# Patient Record
Sex: MALE | Race: WHITE
[De-identification: names, ages, dates, MRNs, and addresses within clinical notes are randomized per-mention and may not be internally consistent; named-entity substitution may affect disease eponyms.]

---

## 2019-02-18 ENCOUNTER — HOSPITAL ENCOUNTER (EMERGENCY)
Dept: HOSPITAL 4 - SED | Age: 44
LOS: 1 days | Discharge: HOME | End: 2019-02-19
Payer: COMMERCIAL

## 2019-02-18 VITALS — SYSTOLIC BLOOD PRESSURE: 136 MMHG

## 2019-02-18 VITALS — HEIGHT: 70 IN | BODY MASS INDEX: 28.63 KG/M2 | WEIGHT: 200 LBS

## 2019-02-18 DIAGNOSIS — Z86.73: ICD-10-CM

## 2019-02-18 DIAGNOSIS — Z88.1: ICD-10-CM

## 2019-02-18 DIAGNOSIS — R06.02: Primary | ICD-10-CM

## 2019-02-18 DIAGNOSIS — I10: ICD-10-CM

## 2019-02-18 LAB
ALBUMIN SERPL BCP-MCNC: 2.7 G/DL (ref 3.4–4.8)
ALT SERPL W P-5'-P-CCNC: 159 U/L (ref 12–78)
AMYLASE SERPL-CCNC: 110 U/L (ref 0–100)
ANION GAP SERPL CALCULATED.3IONS-SCNC: 10 MMOL/L (ref 5–15)
APAP SERPL-MCNC: < 1 UG/ML (ref 1–30)
AST SERPL W P-5'-P-CCNC: 62 U/L (ref 10–37)
BASOPHILS # BLD AUTO: 0.2 K/UL (ref 0–0.2)
BASOPHILS NFR BLD AUTO: 2 % (ref 0–2)
BILIRUB SERPL-MCNC: 0.3 MG/DL (ref 0–1)
BUN SERPL-MCNC: 17 MG/DL (ref 8–21)
CALCIUM SERPL-MCNC: 9.2 MG/DL (ref 8.4–11)
CHLORIDE SERPL-SCNC: 104 MMOL/L (ref 98–107)
CREAT SERPL-MCNC: 0.71 MG/DL (ref 0.55–1.3)
EOSINOPHIL # BLD AUTO: 0.2 K/UL (ref 0–0.4)
EOSINOPHIL NFR BLD AUTO: 1.4 % (ref 0–4)
ERYTHROCYTE [DISTWIDTH] IN BLOOD BY AUTOMATED COUNT: 14.4 % (ref 9–15)
ETHANOL SERPL-MCNC: < 3 MG/DL (ref ?–10)
GFR SERPL CREATININE-BSD FRML MDRD: 155 ML/MIN (ref 90–?)
GLUCOSE SERPL-MCNC: 94 MG/DL (ref 70–99)
HCT VFR BLD AUTO: 45.2 % (ref 36–54)
HGB BLD-MCNC: 14.6 G/DL (ref 14–18)
INR PPP: 0.9 (ref 0.8–1.2)
LIPASE SERPL-CCNC: 365 U/L (ref 73–393)
LYMPHOCYTES # BLD AUTO: 1.1 K/UL (ref 1–5.5)
LYMPHOCYTES NFR BLD AUTO: 9.2 % (ref 20.5–51.5)
MCH RBC QN AUTO: 29 PG (ref 27–31)
MCHC RBC AUTO-ENTMCNC: 32 % (ref 32–36)
MCV RBC AUTO: 91 FL (ref 79–98)
MONOCYTES # BLD MANUAL: 0.8 K/UL (ref 0–1)
MONOCYTES # BLD MANUAL: 6.7 % (ref 1.7–9.3)
NEUTROPHILS # BLD AUTO: 9.2 K/UL (ref 1.8–7.7)
NEUTROPHILS NFR BLD AUTO: 80.7 % (ref 40–70)
PLATELET # BLD AUTO: 191 K/UL (ref 130–430)
POTASSIUM SERPL-SCNC: 3.5 MMOL/L (ref 3.5–5.1)
PROTHROMBIN TIME: 9.4 SECS (ref 9.5–12.5)
RBC # BLD AUTO: 4.99 MIL/UL (ref 4.2–6.2)
SODIUM SERPLBLD-SCNC: 140 MMOL/L (ref 136–145)
WBC # BLD AUTO: 11.5 K/UL (ref 4.8–10.8)

## 2019-02-18 PROCEDURE — 94002 VENT MGMT INPAT INIT DAY: CPT

## 2019-02-18 PROCEDURE — 93005 ELECTROCARDIOGRAM TRACING: CPT

## 2019-02-18 PROCEDURE — G0482 DRUG TEST DEF 15-21 CLASSES: HCPCS

## 2019-02-18 PROCEDURE — 85730 THROMBOPLASTIN TIME PARTIAL: CPT

## 2019-02-18 PROCEDURE — 99284 EMERGENCY DEPT VISIT MOD MDM: CPT

## 2019-02-18 PROCEDURE — 85610 PROTHROMBIN TIME: CPT

## 2019-02-18 PROCEDURE — 94640 AIRWAY INHALATION TREATMENT: CPT

## 2019-02-18 PROCEDURE — 87040 BLOOD CULTURE FOR BACTERIA: CPT

## 2019-02-18 PROCEDURE — 83605 ASSAY OF LACTIC ACID: CPT

## 2019-02-18 PROCEDURE — 85025 COMPLETE CBC W/AUTO DIFF WBC: CPT

## 2019-02-18 PROCEDURE — 82150 ASSAY OF AMYLASE: CPT

## 2019-02-18 PROCEDURE — 36600 WITHDRAWAL OF ARTERIAL BLOOD: CPT

## 2019-02-18 PROCEDURE — 82550 ASSAY OF CK (CPK): CPT

## 2019-02-18 PROCEDURE — 80053 COMPREHEN METABOLIC PANEL: CPT

## 2019-02-18 PROCEDURE — 36415 COLL VENOUS BLD VENIPUNCTURE: CPT

## 2019-02-18 PROCEDURE — 71045 X-RAY EXAM CHEST 1 VIEW: CPT

## 2019-02-18 PROCEDURE — 84484 ASSAY OF TROPONIN QUANT: CPT

## 2019-02-18 PROCEDURE — 82803 BLOOD GASES ANY COMBINATION: CPT

## 2019-02-18 PROCEDURE — 83690 ASSAY OF LIPASE: CPT

## 2019-02-18 PROCEDURE — G0480 DRUG TEST DEF 1-7 CLASSES: HCPCS

## 2019-02-19 VITALS — SYSTOLIC BLOOD PRESSURE: 128 MMHG

## 2020-06-19 ENCOUNTER — HOSPITAL ENCOUNTER (INPATIENT)
Dept: HOSPITAL 26 - MED | Age: 45
LOS: 4 days | Discharge: NURSING, REHAB OR LONG-TERM CARE | DRG: 689 | End: 2020-06-23
Attending: GENERAL PRACTICE | Admitting: GENERAL PRACTICE
Payer: COMMERCIAL

## 2020-06-19 VITALS — DIASTOLIC BLOOD PRESSURE: 82 MMHG | SYSTOLIC BLOOD PRESSURE: 112 MMHG

## 2020-06-19 VITALS — SYSTOLIC BLOOD PRESSURE: 111 MMHG | DIASTOLIC BLOOD PRESSURE: 87 MMHG

## 2020-06-19 VITALS — SYSTOLIC BLOOD PRESSURE: 127 MMHG | DIASTOLIC BLOOD PRESSURE: 74 MMHG

## 2020-06-19 VITALS
WEIGHT: 257 LBS | BODY MASS INDEX: 36.79 KG/M2 | SYSTOLIC BLOOD PRESSURE: 112 MMHG | HEIGHT: 70 IN | DIASTOLIC BLOOD PRESSURE: 82 MMHG

## 2020-06-19 DIAGNOSIS — D69.6: ICD-10-CM

## 2020-06-19 DIAGNOSIS — R80.9: ICD-10-CM
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
## 2020-06-19 DIAGNOSIS — J96.11: ICD-10-CM

## 2020-06-19 DIAGNOSIS — R13.10: ICD-10-CM

## 2020-06-19 DIAGNOSIS — Z86.73: ICD-10-CM

## 2020-06-19 DIAGNOSIS — G82.50: ICD-10-CM

## 2020-06-19 DIAGNOSIS — D64.9: ICD-10-CM

## 2020-06-19 DIAGNOSIS — N39.0: Primary | ICD-10-CM

## 2020-06-19 DIAGNOSIS — G40.909: ICD-10-CM

## 2020-06-19 DIAGNOSIS — Z93.1: ICD-10-CM

## 2020-06-19 DIAGNOSIS — Z20.828: ICD-10-CM

## 2020-06-19 DIAGNOSIS — Z99.11: ICD-10-CM

## 2020-06-19 DIAGNOSIS — Z93.0: ICD-10-CM

## 2020-06-19 DIAGNOSIS — J69.0: ICD-10-CM

## 2020-06-19 LAB
ALBUMIN FLD-MCNC: 2.9 G/DL (ref 3.4–5)
ANION GAP SERPL CALCULATED.3IONS-SCNC: 10.6 MMOL/L (ref 8–16)
APPEARANCE UR: (no result)
AST SERPL-CCNC: 16 U/L (ref 15–37)
BARBITURATES UR QL SCN: NEGATIVE NG/ML
BASOPHILS # BLD AUTO: 0 K/UL (ref 0–0.22)
BASOPHILS NFR BLD AUTO: 0.3 % (ref 0–2)
BENZODIAZ UR QL SCN: NEGATIVE NG/ML
BILIRUB SERPL-MCNC: 0.3 MG/DL (ref 0–1)
BILIRUB UR QL STRIP: (no result)
BUN SERPL-MCNC: 23 MG/DL (ref 7–18)
BZE UR QL SCN: NEGATIVE NG/ML
CANNABINOIDS UR QL SCN: NEGATIVE NG/ML
CHLORIDE SERPL-SCNC: 107 MMOL/L (ref 98–107)
CO2 SERPL-SCNC: 29.4 MMOL/L (ref 21–32)
COLOR UR: (no result)
CREAT SERPL-MCNC: 1.4 MG/DL (ref 0.6–1.3)
EOSINOPHIL # BLD AUTO: 0.2 K/UL (ref 0–0.4)
EOSINOPHIL NFR BLD AUTO: 2.1 % (ref 0–4)
ERYTHROCYTE [DISTWIDTH] IN BLOOD BY AUTOMATED COUNT: 17.3 % (ref 11.6–13.7)
GFR SERPL CREATININE-BSD FRML MDRD: 70 ML/MIN (ref 90–?)
GLUCOSE SERPL-MCNC: 110 MG/DL (ref 74–106)
GLUCOSE UR STRIP-MCNC: (no result) MG/DL
HCT VFR BLD AUTO: 36.6 % (ref 36–52)
HGB BLD-MCNC: 11.9 G/DL (ref 12–18)
HGB UR QL STRIP: (no result)
LEUKOCYTE ESTERASE UR QL STRIP: (no result)
LYMPHOCYTES # BLD AUTO: 1.1 K/UL (ref 2–11.5)
LYMPHOCYTES NFR BLD AUTO: 10.3 % (ref 20.5–51.1)
MAGNESIUM SERPL-MCNC: 1.9 MG/DL (ref 1.8–2.4)
MCH RBC QN AUTO: 28 PG (ref 27–31)
MCHC RBC AUTO-ENTMCNC: 32 G/DL (ref 33–37)
MCV RBC AUTO: 85.2 FL (ref 80–94)
MONOCYTES # BLD AUTO: 0.8 K/UL (ref 0.8–1)
MONOCYTES NFR BLD AUTO: 7.6 % (ref 1.7–9.3)
NEUTROPHILS # BLD AUTO: 8.5 K/UL (ref 1.8–7.7)
NEUTROPHILS NFR BLD AUTO: 79.7 % (ref 42.2–75.2)
NITRITE UR QL STRIP: POSITIVE
OPIATES UR QL SCN: NEGATIVE NG/ML
PCP UR QL SCN: NEGATIVE NG/ML
PH UR STRIP: 7 [PH] (ref 5–9)
PHOSPHATE SERPL-MCNC: 3.7 MG/DL (ref 2.5–4.9)
PLATELET # BLD AUTO: 122 K/UL (ref 140–450)
POTASSIUM SERPL-SCNC: 4 MMOL/L (ref 3.5–5.1)
RBC # BLD AUTO: 4.29 MIL/UL (ref 4.2–6.1)
RBC #/AREA URNS HPF: (no result) /HPF (ref 0–5)
SODIUM SERPL-SCNC: 143 MMOL/L (ref 136–145)
TSH SERPL DL<=0.05 MIU/L-ACNC: 2.24 UIU/ML (ref 0.34–3.74)
WBC # BLD AUTO: 10.7 K/UL (ref 4.8–10.8)

## 2020-06-19 PROCEDURE — 5A1945Z RESPIRATORY VENTILATION, 24-96 CONSECUTIVE HOURS: ICD-10-PCS | Performed by: GENERAL PRACTICE

## 2020-06-19 PROCEDURE — 02HV33Z INSERTION OF INFUSION DEVICE INTO SUPERIOR VENA CAVA, PERCUTANEOUS APPROACH: ICD-10-PCS | Performed by: GENERAL PRACTICE

## 2020-06-19 PROCEDURE — C1751 CATH, INF, PER/CENT/MIDLINE: HCPCS

## 2020-06-19 PROCEDURE — B548ZZA ULTRASONOGRAPHY OF SUPERIOR VENA CAVA, GUIDANCE: ICD-10-PCS | Performed by: GENERAL PRACTICE

## 2020-06-19 RX ADMIN — Medication SCH ML: at 21:00

## 2020-06-19 RX ADMIN — LEVETIRACETAM SCH MG: 100 SOLUTION ORAL at 22:05

## 2020-06-19 RX ADMIN — SODIUM CHLORIDE SCH MLS/HR: 9 INJECTION, SOLUTION INTRAVENOUS at 21:50

## 2020-06-19 RX ADMIN — Medication SCH EACH: at 22:05

## 2020-06-19 NOTE — NUR
43 Y/O MALE BIBA FROM [facility] [place], STAFF REPORTS SEPSIS/FEVER/ & UTI 
(CURRENTLY BEING TREATED WITH ANTIBX). PT IS AFEBRILE. TRACH TO VENT. DENIES 
ANY PAIN AT THIS TIME. RESP EVEN AND UNLABORED. DIMINISHED LUNG SOUNDS IN BILAT 
BASES. G TUBE PRESENT. SON CATH PRESENT WITH BLOODY URINE. BLOOD NOTED AROUND 
SON INSERTION SITE. PT IS NON VERBAL. SKIN IS COOL/INTACT.

## 2020-06-19 NOTE — NUR
PT RECEIVED FROM ER, TRANSFERRED TO ICU - 7. BEDSIDE REPORT RECEIVED SARITHA CHESTER. SINUS 
RHYTHM ON MONITOR. TRACH TO VENT, AC/VC MODE FIO2 36%, , RR 12, PEEP 5. L WRIST 22 G, 
INTACT, PATENT, GOOD BLOOD RETURN, INFUSING NS 60ML/HR. GTUBE IN PLACE, INTACT, PATENT, NO 
RESIDUALS NOTED. BRIGHT RED HEMATURIA IN THE SON CATHETER. SKIN IS WARM AND DRY. AFEBRILE. 
RIGIDITY IN BUE/BLE. HOB 30 DEGREES. SEIZURE PRECAUTIONS IN PLACE. BED LOCKED IN LOWEST 
POSITION. WILL CONTINUE TO MONITOR.

-------------------------------------------------------------------------------

Addendum: 06/20/20 at 0019 by Miles Brown RN

-------------------------------------------------------------------------------

FENTANYL PATCH ON L UPPER CHEST.

## 2020-06-19 NOTE — NUR
Patient will be admitted to care of DR. MOQSUERA. Admited to TELEMETRY.  Will go 
to room ICU7. Belongings list completed.  Report to NEMO SCHWARTZ.

## 2020-06-19 NOTE — NUR
PT TRANSFERRED TO ICU BED 7.VENT PLUGGED INTO RED OUTLET. BMV AT BEDSIDE. TRACH SECURED AND 
INTACT. ALARMS SET. PT IN NO DISTRESS. WILL CONTINUE TO MONITOR

## 2020-06-19 NOTE — NUR
PHONE CALL FROM PTS SISTER DEVONTE LUCAS ([phone]), UPDATED ON PTS PRESENT 
CONDITION.QUESTIONS ANSWERED.

## 2020-06-19 NOTE — NUR
RECEIVED PT FROM DAY SHIFT ON DOCUMENTED SETTINGS. VENT PLUGGED INTO RED OUTLET. BMV AT 
BEDSIDE. TRACH SECURED AND INTACT. ALARMS SET. PT IN NO DISTRESS. WILL CONTINUE TO MONITOR

## 2020-06-19 NOTE — NUR
PHONE CALL TO [facility] [place]; SPOKE WITH NIGHAT CHESTER.SHE SAID SHE DOESN'T HAVE THE RECORD OF THE 
PT AT THIS TIME FOR THE PNEUMONIA /FLU VACCINE.TO TRY AND CONTACT IN AM.

## 2020-06-19 NOTE — NUR
S/W RUBI, SISTER [phone] WOULD LIKE AN UPDATE WHEN PT IS MOVED TO 
HIS PERMANENT ROOM. WILL INFORM RECEIVING NURSE.

## 2020-06-20 VITALS — SYSTOLIC BLOOD PRESSURE: 117 MMHG | DIASTOLIC BLOOD PRESSURE: 86 MMHG

## 2020-06-20 VITALS — DIASTOLIC BLOOD PRESSURE: 77 MMHG | SYSTOLIC BLOOD PRESSURE: 116 MMHG

## 2020-06-20 VITALS — DIASTOLIC BLOOD PRESSURE: 73 MMHG | SYSTOLIC BLOOD PRESSURE: 109 MMHG

## 2020-06-20 VITALS — DIASTOLIC BLOOD PRESSURE: 83 MMHG | SYSTOLIC BLOOD PRESSURE: 146 MMHG

## 2020-06-20 VITALS — DIASTOLIC BLOOD PRESSURE: 68 MMHG | SYSTOLIC BLOOD PRESSURE: 105 MMHG

## 2020-06-20 VITALS — DIASTOLIC BLOOD PRESSURE: 86 MMHG | SYSTOLIC BLOOD PRESSURE: 116 MMHG

## 2020-06-20 VITALS — DIASTOLIC BLOOD PRESSURE: 77 MMHG | SYSTOLIC BLOOD PRESSURE: 113 MMHG

## 2020-06-20 VITALS — SYSTOLIC BLOOD PRESSURE: 127 MMHG | DIASTOLIC BLOOD PRESSURE: 76 MMHG

## 2020-06-20 VITALS — SYSTOLIC BLOOD PRESSURE: 114 MMHG | DIASTOLIC BLOOD PRESSURE: 77 MMHG

## 2020-06-20 VITALS — SYSTOLIC BLOOD PRESSURE: 111 MMHG | DIASTOLIC BLOOD PRESSURE: 73 MMHG

## 2020-06-20 VITALS — SYSTOLIC BLOOD PRESSURE: 109 MMHG | DIASTOLIC BLOOD PRESSURE: 77 MMHG

## 2020-06-20 VITALS — SYSTOLIC BLOOD PRESSURE: 127 MMHG | DIASTOLIC BLOOD PRESSURE: 83 MMHG

## 2020-06-20 VITALS — SYSTOLIC BLOOD PRESSURE: 107 MMHG | DIASTOLIC BLOOD PRESSURE: 76 MMHG

## 2020-06-20 LAB
ANION GAP SERPL CALCULATED.3IONS-SCNC: 10.2 MMOL/L (ref 8–16)
BASOPHILS # BLD AUTO: 0.1 K/UL (ref 0–0.22)
BASOPHILS # BLD AUTO: 0.1 K/UL (ref 0–0.22)
BASOPHILS NFR BLD AUTO: 1.1 % (ref 0–2)
BASOPHILS NFR BLD AUTO: 1.2 % (ref 0–2)
BUN SERPL-MCNC: 21 MG/DL (ref 7–18)
CHLORIDE SERPL-SCNC: 109 MMOL/L (ref 98–107)
CHOLEST/HDLC SERPL: 3.9 {RATIO} (ref 1–4.5)
CO2 SERPL-SCNC: 29.3 MMOL/L (ref 21–32)
CREAT SERPL-MCNC: 1 MG/DL (ref 0.6–1.3)
EOSINOPHIL # BLD AUTO: 0.6 K/UL (ref 0–0.4)
EOSINOPHIL # BLD AUTO: 0.7 K/UL (ref 0–0.4)
EOSINOPHIL NFR BLD AUTO: 10 % (ref 0–4)
EOSINOPHIL NFR BLD AUTO: 10.3 % (ref 0–4)
ERYTHROCYTE [DISTWIDTH] IN BLOOD BY AUTOMATED COUNT: 17 % (ref 11.6–13.7)
ERYTHROCYTE [DISTWIDTH] IN BLOOD BY AUTOMATED COUNT: 17.2 % (ref 11.6–13.7)
GFR SERPL CREATININE-BSD FRML MDRD: 104 ML/MIN (ref 90–?)
GLUCOSE SERPL-MCNC: 87 MG/DL (ref 74–106)
HCT VFR BLD AUTO: 33.8 % (ref 36–52)
HCT VFR BLD AUTO: 34.8 % (ref 36–52)
HDLC SERPL-MCNC: 26 MG/DL (ref 40–60)
HGB BLD-MCNC: 10.9 G/DL (ref 12–18)
HGB BLD-MCNC: 11.2 G/DL (ref 12–18)
LDH SERPL-CCNC: 116 U/L (ref 85–227)
LDLC SERPL CALC-MCNC: 55 MG/DL (ref 60–100)
LYMPHOCYTES # BLD AUTO: 1.5 K/UL (ref 2–11.5)
LYMPHOCYTES # BLD AUTO: 2.2 K/UL (ref 2–11.5)
LYMPHOCYTES NFR BLD AUTO: 25.5 % (ref 20.5–51.1)
LYMPHOCYTES NFR BLD AUTO: 33.3 % (ref 20.5–51.1)
MAGNESIUM SERPL-MCNC: 1.7 MG/DL (ref 1.8–2.4)
MCH RBC QN AUTO: 28 PG (ref 27–31)
MCH RBC QN AUTO: 28 PG (ref 27–31)
MCHC RBC AUTO-ENTMCNC: 32 G/DL (ref 33–37)
MCHC RBC AUTO-ENTMCNC: 32 G/DL (ref 33–37)
MCV RBC AUTO: 86 FL (ref 80–94)
MCV RBC AUTO: 86.9 FL (ref 80–94)
MONOCYTES # BLD AUTO: 0.4 K/UL (ref 0.8–1)
MONOCYTES # BLD AUTO: 0.5 K/UL (ref 0.8–1)
MONOCYTES NFR BLD AUTO: 6.6 % (ref 1.7–9.3)
MONOCYTES NFR BLD AUTO: 8.3 % (ref 1.7–9.3)
NEUTROPHILS # BLD AUTO: 3.2 K/UL (ref 1.8–7.7)
NEUTROPHILS # BLD AUTO: 3.3 K/UL (ref 1.8–7.7)
NEUTROPHILS NFR BLD AUTO: 48.6 % (ref 42.2–75.2)
NEUTROPHILS NFR BLD AUTO: 55.1 % (ref 42.2–75.2)
PHOSPHATE SERPL-MCNC: 3.1 MG/DL (ref 2.5–4.9)
PLATELET # BLD AUTO: 102 K/UL (ref 140–450)
PLATELET # BLD AUTO: 112 K/UL (ref 140–450)
POTASSIUM SERPL-SCNC: 3.5 MMOL/L (ref 3.5–5.1)
RBC # BLD AUTO: 3.93 MIL/UL (ref 4.2–6.1)
RBC # BLD AUTO: 4.01 MIL/UL (ref 4.2–6.1)
SODIUM SERPL-SCNC: 145 MMOL/L (ref 136–145)
TRIGL SERPL-MCNC: 104 MG/DL (ref 30–150)
WBC # BLD AUTO: 6 K/UL (ref 4.8–10.8)
WBC # BLD AUTO: 6.5 K/UL (ref 4.8–10.8)

## 2020-06-20 RX ADMIN — DOCUSATE SODIUM SCH MG: 50 LIQUID ORAL at 08:38

## 2020-06-20 RX ADMIN — Medication SCH EACH: at 20:56

## 2020-06-20 RX ADMIN — DOCUSATE SODIUM SCH MG: 50 LIQUID ORAL at 20:56

## 2020-06-20 RX ADMIN — LEVETIRACETAM SCH MG: 100 SOLUTION ORAL at 20:56

## 2020-06-20 RX ADMIN — LEVETIRACETAM SCH MG: 100 SOLUTION ORAL at 08:37

## 2020-06-20 RX ADMIN — Medication SCH DOSE: at 20:56

## 2020-06-20 RX ADMIN — Medication SCH ML: at 08:38

## 2020-06-20 RX ADMIN — Medication SCH DOSE: at 09:07

## 2020-06-20 RX ADMIN — SODIUM CHLORIDE SCH MLS/HR: 9 INJECTION, SOLUTION INTRAVENOUS at 14:47

## 2020-06-20 RX ADMIN — Medication SCH EACH: at 08:38

## 2020-06-20 NOTE — NUR
RECEIVED BEDSIDE REPORT FROM NIGHT SHIFT NURSE, PT RESTING, NO DISTRESS NOTED, PT AWAKE, 
ALERT, ON TRACH TO VENT, FIO2 36%, RR12, PEEP 5, NO SOB NOTED, SATURATION 100%, IV TO L FA 
22G, PATENT, INTACT, INFUSING WELL, FC IN PLACE WITH IRRIGATION, DRAINING TO GRAVITY, 
DRAINING RED COLORED FLUID, INITIAL ASSESSMENT DONE, ALL SAFETY PRECAUTION MET, WILL 
CONTINUE TO MONITOR. 

-------------------------------------------------------------------------------

Addendum: 06/20/20 at 1546 by Inés Alanis RN

-------------------------------------------------------------------------------

G TUBE IN PLACE WITH FEEDING, TOLERATING WELL, RESIDUAL 10ML

## 2020-06-20 NOTE — NUR
RECEIVED ON A CARESCAPE R860 VENTILATOR PLUGGED INTO RED OUTLET TOLERATING WELL WITHOUT 
ADVERSE REACTIONS NOTED TO PORTEX DCT #7 AIRWAY SECURED WITH A PEDRITO TRACH TIE CUFF PRESSURE 
CHECKED AS NOTED LOC AWAKE STABLE GOOD CHEST RISE AIRWAY PATENT

## 2020-06-20 NOTE — NUR
ORAL CARE PROVIDED. PT REPOSITIONED. SKIN WARM AND DRY. SAFETY PRECAUTIONS IN PLACE. WILL 
CONTINUE TO MONITOR.

## 2020-06-20 NOTE — NUR
CALLED [facility] REGARDING PT FLU AND PNA VACCINES, PER [place] PT HAD RECEIVED PNA 
VACCINE IN MARCH OF 2020 AND FLU VACCINE IN OCT 2019. WILL PUT IN DOCUMENTATION.

## 2020-06-20 NOTE — NUR
PT HAS EYES CLOSED, SINUS BRADYCARDIA ON MONITOR. CONTINUOS BLADDER IRRIGATION INFUSING. 
HEMATURIA IN THE SON BAG. WILL CONTINUE TO MONITOR.

## 2020-06-20 NOTE — NUR
BEDSIDE REPORT RECEIVED FROM AM SHIFT RN. PT AWAKE, ALERT. SINUS BRADYCARDIA ON MONITOR. 
TRACH TO VENT, AC/VC FIO2 30%, , RATE 12, PEEP 5. IV SITE, L WRIST 22 GAUGE, INFUSING 
NS AT 60 ML/HR, INTACT, PATENT, GOOD BLOOD RETURN. 3 WAY SON CATHETER IN PLACE, CONTINUOUS 
BLADDER IRRIGATION RUNNING. DARK RED HEMATURIA DRAINING IN SON BAG. GTUBE TO FEEDING, NO 
RESIDUALS NOTED. SKIN INTACT, WARM AND DRY, FENTANYL PATCH NOTED ON RU CHEST. HOB 30 
DEGREES. BED LOCKED IN LOWEST POSITION. WILL CONTINUE TO MONITOR.

## 2020-06-20 NOTE — NUR
PT REMAINS ON DOCUMENTED SETTINGS. NO CHANGES MADE.TRACH SECURED AND INTACT. PATENT AIRWAY. 
PT IS IN NO DISTRESS. WILL CONT TO MONITOR

## 2020-06-20 NOTE — NUR
REPOSITIONED PT, PT STATED HAVING PAIN, 8/10, PAIN MEDICATION GIVEN, PT TOLERATED WELL, WILL 
CONTINUE TO MONITOR.

## 2020-06-20 NOTE — NUR
SON 3 WAY CATHETER INSERTED. CONTINUOS BLADDER IRRIGATION STARTED. BRIGHT RED HEMATURIA 
DRAINING IN SON BAG. WILL CONTINUE TO MONITOR.

## 2020-06-20 NOTE — NUR
RECEIVED PT FROM DAY SHIFT ON DOCUMENTED SETTINGS. VENT PLUGGED INTO RED OUTLET. BMV AT 
BEDSIDE. TRACH SECURED AND INTACT. ALARMS SET. PATENT AIRWAY. CUFF PRESSURE CHECKED. PT IN 
NO DISTRESS. WILL CONTINUE TO MONITOR

## 2020-06-20 NOTE — NUR
FEEDING STARTED, JEVITY 1.2 AT 10 CC/HR. GOAL 50CC/HR. 150 WATER FLUSH, Q6H. WILL CONTINUE 
TO MONITOR.

## 2020-06-21 VITALS — DIASTOLIC BLOOD PRESSURE: 83 MMHG | SYSTOLIC BLOOD PRESSURE: 121 MMHG

## 2020-06-21 VITALS — SYSTOLIC BLOOD PRESSURE: 117 MMHG | DIASTOLIC BLOOD PRESSURE: 74 MMHG

## 2020-06-21 VITALS — SYSTOLIC BLOOD PRESSURE: 118 MMHG | DIASTOLIC BLOOD PRESSURE: 80 MMHG

## 2020-06-21 VITALS — DIASTOLIC BLOOD PRESSURE: 83 MMHG | SYSTOLIC BLOOD PRESSURE: 128 MMHG

## 2020-06-21 VITALS — SYSTOLIC BLOOD PRESSURE: 138 MMHG | DIASTOLIC BLOOD PRESSURE: 97 MMHG

## 2020-06-21 VITALS — DIASTOLIC BLOOD PRESSURE: 85 MMHG | SYSTOLIC BLOOD PRESSURE: 123 MMHG

## 2020-06-21 VITALS — SYSTOLIC BLOOD PRESSURE: 122 MMHG | DIASTOLIC BLOOD PRESSURE: 85 MMHG

## 2020-06-21 VITALS — SYSTOLIC BLOOD PRESSURE: 105 MMHG | DIASTOLIC BLOOD PRESSURE: 72 MMHG

## 2020-06-21 VITALS — DIASTOLIC BLOOD PRESSURE: 85 MMHG | SYSTOLIC BLOOD PRESSURE: 128 MMHG

## 2020-06-21 VITALS — SYSTOLIC BLOOD PRESSURE: 105 MMHG | DIASTOLIC BLOOD PRESSURE: 74 MMHG

## 2020-06-21 VITALS — SYSTOLIC BLOOD PRESSURE: 123 MMHG | DIASTOLIC BLOOD PRESSURE: 86 MMHG

## 2020-06-21 VITALS — SYSTOLIC BLOOD PRESSURE: 122 MMHG | DIASTOLIC BLOOD PRESSURE: 94 MMHG

## 2020-06-21 VITALS — DIASTOLIC BLOOD PRESSURE: 97 MMHG | SYSTOLIC BLOOD PRESSURE: 138 MMHG

## 2020-06-21 VITALS — DIASTOLIC BLOOD PRESSURE: 84 MMHG | SYSTOLIC BLOOD PRESSURE: 149 MMHG

## 2020-06-21 VITALS — DIASTOLIC BLOOD PRESSURE: 104 MMHG | SYSTOLIC BLOOD PRESSURE: 143 MMHG

## 2020-06-21 VITALS — DIASTOLIC BLOOD PRESSURE: 79 MMHG | SYSTOLIC BLOOD PRESSURE: 117 MMHG

## 2020-06-21 VITALS — SYSTOLIC BLOOD PRESSURE: 128 MMHG | DIASTOLIC BLOOD PRESSURE: 85 MMHG

## 2020-06-21 VITALS — DIASTOLIC BLOOD PRESSURE: 94 MMHG | SYSTOLIC BLOOD PRESSURE: 120 MMHG

## 2020-06-21 LAB
ALBUMIN FLD-MCNC: 2.7 G/DL (ref 3.4–5)
ANION GAP SERPL CALCULATED.3IONS-SCNC: 8.6 MMOL/L (ref 8–16)
AST SERPL-CCNC: 15 U/L (ref 15–37)
BASOPHILS # BLD AUTO: 0.1 K/UL (ref 0–0.22)
BASOPHILS NFR BLD AUTO: 1.1 % (ref 0–2)
BILIRUB SERPL-MCNC: 0.2 MG/DL (ref 0–1)
BUN SERPL-MCNC: 17 MG/DL (ref 7–18)
CHLORIDE SERPL-SCNC: 106 MMOL/L (ref 98–107)
CO2 SERPL-SCNC: 30 MMOL/L (ref 21–32)
CREAT SERPL-MCNC: 0.7 MG/DL (ref 0.6–1.3)
EOSINOPHIL # BLD AUTO: 0.6 K/UL (ref 0–0.4)
EOSINOPHIL NFR BLD AUTO: 11.5 % (ref 0–4)
ERYTHROCYTE [DISTWIDTH] IN BLOOD BY AUTOMATED COUNT: 16.7 % (ref 11.6–13.7)
GFR SERPL CREATININE-BSD FRML MDRD: 157 ML/MIN (ref 90–?)
GLUCOSE SERPL-MCNC: 117 MG/DL (ref 74–106)
HCT VFR BLD AUTO: 30.6 % (ref 36–52)
HGB BLD-MCNC: 10.1 G/DL (ref 12–18)
LYMPHOCYTES # BLD AUTO: 1 K/UL (ref 2–11.5)
LYMPHOCYTES NFR BLD AUTO: 18.6 % (ref 20.5–51.1)
MCH RBC QN AUTO: 28 PG (ref 27–31)
MCHC RBC AUTO-ENTMCNC: 33 G/DL (ref 33–37)
MCV RBC AUTO: 85 FL (ref 80–94)
MONOCYTES # BLD AUTO: 0.6 K/UL (ref 0.8–1)
MONOCYTES NFR BLD AUTO: 10 % (ref 1.7–9.3)
NEUTROPHILS # BLD AUTO: 3.3 K/UL (ref 1.8–7.7)
NEUTROPHILS NFR BLD AUTO: 58.8 % (ref 42.2–75.2)
PLATELET # BLD AUTO: 92 K/UL (ref 140–450)
POTASSIUM SERPL-SCNC: 3.6 MMOL/L (ref 3.5–5.1)
RBC # BLD AUTO: 3.6 MIL/UL (ref 4.2–6.1)
SODIUM SERPL-SCNC: 141 MMOL/L (ref 136–145)
WBC # BLD AUTO: 5.5 K/UL (ref 4.8–10.8)

## 2020-06-21 RX ADMIN — Medication SCH DOSE: at 09:34

## 2020-06-21 RX ADMIN — SENNOSIDES A AND B SCH MG: 8.6 TABLET, FILM COATED ORAL at 13:23

## 2020-06-21 RX ADMIN — Medication SCH DOSE: at 21:00

## 2020-06-21 RX ADMIN — SENNOSIDES A AND B SCH MG: 8.6 TABLET, FILM COATED ORAL at 18:00

## 2020-06-21 RX ADMIN — DOCUSATE SODIUM SCH MG: 50 LIQUID ORAL at 09:33

## 2020-06-21 RX ADMIN — Medication SCH EACH: at 20:14

## 2020-06-21 RX ADMIN — DOCUSATE SODIUM SCH MG: 50 LIQUID ORAL at 20:14

## 2020-06-21 RX ADMIN — SODIUM CHLORIDE SCH MLS/HR: 9 INJECTION, SOLUTION INTRAVENOUS at 03:46

## 2020-06-21 RX ADMIN — Medication SCH EACH: at 09:33

## 2020-06-21 RX ADMIN — LEVETIRACETAM SCH MG: 100 SOLUTION ORAL at 20:14

## 2020-06-21 RX ADMIN — LEVETIRACETAM SCH MG: 100 SOLUTION ORAL at 09:33

## 2020-06-21 NOTE — NUR
6/21/20 

RD INITIAL ASSESSMENT COMPLETED



PLEASE REFER TO NUTRITION ASSESSMENT UNDER CARE ACTIVITY FOR ESTIMATED NUTRITIONAL NEEDS. 



CONTINUE TUBE FEED OF JEVITY 1.2 AT GOAL RATE OF 5O ML/HR AS TOLERATED

- THIS WILL PROVIDE 1440 KCAL AND 67 G OF PROTEIN

2. CONTINUE FREE H20 FLUSH 150 ML Q6H

3. RD TO FOLLOW-UP 2-3 DAYS, HIGH RISK 



SÁNCHEZ BAUTISTA, RD

## 2020-06-21 NOTE — NUR
RECEIVED REPORT FROM AM SHIFT. PATIENT SEEN AND ASSESSED. PATIENT TRACH TO VENT WITH PORTEX 
SIZE 7 AND SECURED WITH TRACH TIE. AUSCULTATION REVEALS BILATERAL COARSE BREATH SOUNDS. 
NOTICED ADEQUATE BILATERAL CHEST RISE AND FALL. PATIENT ON VENT SETTINGS AC/VC 12, 500, +5, 
30% WITH SPO2 OF 98%. VENT PLUGGED IN RED OUTLET, HOB > 30 DEGREES, AMBU BAG AT BEDSIDE, AND 
ALARMS SET AND AUDIBLE. PATIENT IS IN NO RESPIRATORY DISTRESS AT THIS TIME. SUCTION SMALL 
YELLOW THICK SECRETIONS FROM TUBE. AIRWAY IS PATIENT. WILL CONTINUE TO MONITOR PATIENT.

## 2020-06-21 NOTE — NUR
rec'd pt on carescape vent settings ac 12 vt 500 peep 5 fio2 30% alarms on and audible and 
ambu bag at hob and vent is plugged into red outlet, sxn pt small amt of yellow secretions, 
b\s are clear pt is trach with portex 7 and pt is resting

## 2020-06-21 NOTE — NUR
PATIENT HAS BEEN SCREENED AND CATEGORIZED AS HIGH NUTRITION RISK. PATIENT WILL BE SEEN 
WITHIN 1-2 DAYS OF ADMISSION.



06/21/20 06/22/20



SÁNCHEZ BAUTISTA RD

## 2020-06-21 NOTE — NUR
RECEIVED PT FROM CHELSEY RNEROS. PT AWAKE, NON-VERBAL, REPOSNDS TO PAINFUL 
STIMULI.FLACC 0. TRACH TO VENT WITH SETTINGS AS FOLLOWS: ACVC: FIO2 30%, , RT 12, PEEP 
5. SPO2 100% BILATERAL CX RISE AND FALL SYMMETRICAL. +S1, S2 UPON AUSCULTATION. LUNGS 
DIMINISHED THROUGHOUT. PICC TO RT UPPER ARM WITH NS @ 60 ML/HR. BOWEL SOUNDS ACTIVE X4. 
GTUBE IN PLACE WITH JEVITY 1.2 @ 50ML/ HR WITH  ML Q6H. ZERO RESIDUAL NOTED. SON 
CATH IN PLACE WITH CONTINUOUS BLADDER IRRIGATION IN PROGRESS. CLEAR, LIGHT-RED URINE NOTED 
TO CATH BAG. RIGID BI UPPER AND LOWER EXTREMITIES. SKIN WARM, DRY, AND INTACT. 
SAFETY/SEIZURE PRECAUTIONS REMAIN IN PLACE. BED LOW AND LOCKED. WILL CONT TO MONITOR FOR 
CHANGES.

## 2020-06-21 NOTE — NUR
pt. is trach to vent  SETTING  UNCHANGED VITAL SIGHN WITH IN NORMAL LIMIT G.TUBE FEEDING IN 
PROGRESS IV FLUID INFUSING ON PERIPHERAL LINE .G.TUBE FEEDING IN PROGRESS.

## 2020-06-21 NOTE — NUR
CONTINUES ON BLADDER IRRIGATION WITHOUT AND ASE NOTED. VSS. VAP ORAL CARE PROVIDED. 
REPOSITIONED WITH PRESSURE AREAS OFFLOADED. SAFETY PRECAUTIONS IN PLACE WITH BED LOW AND 
LOCKED. WILL CONT TO MONITOR FOR CHANGES.

## 2020-06-21 NOTE — NUR
RECEIVED BEDSIDE REPORT FROM NIGHT SHIFT NURSE, PT RESTING, NO DISTRESS NOTED, PT AWAKE, 
ALERT, ON TRACH TO VENT, FIO2 36%, RR12, PEEP 5, NO SOB NOTED, SATURATION 100%, IV TO L FA 
22G, PATENT, INTACT, INFUSING WELL, R UPPER ARM PICC, DOUBLE LUMEN, INTACT, PATENT, FC IN 
PLACE WITH IRRIGATION, DRAINING TO GRAVITY, DRAINING RED COLORED FLUID, GTUBE IN PLACE, 
TOLERATED WILL, RESIDUAL 5ML, INITIAL ASSESSMENT DONE, ALL SAFETY PRECAUTION MET, WILL 
CONTINUE TO MONITOR.

## 2020-06-21 NOTE — NUR
HUNG NEW BAG OF CONTINUOS BLADDER IRRIGATION. DARK RED HEMATURIA DRAINING IN SON BAG. WILL 
CONTINUE TO MONITOR.

## 2020-06-21 NOTE — NUR
PT REMAINS ON DOCUMENTED SETTINGS. NO CHANGES MADE. TRACH SECURED AND INTACT. ALARMS SET. PT 
IN NO DISTRESS. WILL CONT TO MONITOR

## 2020-06-22 VITALS — SYSTOLIC BLOOD PRESSURE: 133 MMHG | DIASTOLIC BLOOD PRESSURE: 91 MMHG

## 2020-06-22 VITALS — SYSTOLIC BLOOD PRESSURE: 116 MMHG | DIASTOLIC BLOOD PRESSURE: 84 MMHG

## 2020-06-22 VITALS — DIASTOLIC BLOOD PRESSURE: 84 MMHG | SYSTOLIC BLOOD PRESSURE: 116 MMHG

## 2020-06-22 VITALS — SYSTOLIC BLOOD PRESSURE: 147 MMHG | DIASTOLIC BLOOD PRESSURE: 93 MMHG

## 2020-06-22 VITALS — SYSTOLIC BLOOD PRESSURE: 133 MMHG | DIASTOLIC BLOOD PRESSURE: 86 MMHG

## 2020-06-22 VITALS — SYSTOLIC BLOOD PRESSURE: 135 MMHG | DIASTOLIC BLOOD PRESSURE: 85 MMHG

## 2020-06-22 VITALS — SYSTOLIC BLOOD PRESSURE: 146 MMHG | DIASTOLIC BLOOD PRESSURE: 103 MMHG

## 2020-06-22 VITALS — DIASTOLIC BLOOD PRESSURE: 82 MMHG | SYSTOLIC BLOOD PRESSURE: 129 MMHG

## 2020-06-22 VITALS — SYSTOLIC BLOOD PRESSURE: 125 MMHG | DIASTOLIC BLOOD PRESSURE: 76 MMHG

## 2020-06-22 VITALS — DIASTOLIC BLOOD PRESSURE: 86 MMHG | SYSTOLIC BLOOD PRESSURE: 137 MMHG

## 2020-06-22 VITALS — SYSTOLIC BLOOD PRESSURE: 127 MMHG | DIASTOLIC BLOOD PRESSURE: 80 MMHG

## 2020-06-22 VITALS — DIASTOLIC BLOOD PRESSURE: 85 MMHG | SYSTOLIC BLOOD PRESSURE: 127 MMHG

## 2020-06-22 VITALS — SYSTOLIC BLOOD PRESSURE: 100 MMHG

## 2020-06-22 RX ADMIN — SENNOSIDES A AND B SCH MG: 8.6 TABLET, FILM COATED ORAL at 17:47

## 2020-06-22 RX ADMIN — DOCUSATE SODIUM SCH MG: 50 LIQUID ORAL at 20:31

## 2020-06-22 RX ADMIN — DOCUSATE SODIUM SCH MG: 50 LIQUID ORAL at 08:18

## 2020-06-22 RX ADMIN — SENNOSIDES A AND B SCH MG: 8.6 TABLET, FILM COATED ORAL at 08:18

## 2020-06-22 RX ADMIN — Medication SCH DOSE: at 08:21

## 2020-06-22 RX ADMIN — SENNOSIDES A AND B SCH MG: 8.6 TABLET, FILM COATED ORAL at 13:55

## 2020-06-22 RX ADMIN — Medication SCH DRP: at 20:39

## 2020-06-22 RX ADMIN — Medication SCH EACH: at 20:31

## 2020-06-22 RX ADMIN — Medication SCH EACH: at 08:18

## 2020-06-22 RX ADMIN — LEVETIRACETAM SCH MG: 100 SOLUTION ORAL at 20:31

## 2020-06-22 RX ADMIN — LEVETIRACETAM SCH MG: 100 SOLUTION ORAL at 08:18

## 2020-06-22 RX ADMIN — SODIUM CHLORIDE SCH MLS/HR: 9 INJECTION, SOLUTION INTRAVENOUS at 03:46

## 2020-06-22 NOTE — NUR
DISCHARGE PLANNING:



CONTACTED [facility] [phone], NO ANSWER.  LEFT MESSAGE.

-------------------------------------------------------------------------------

Addendum: 06/22/20 at 1130 by Deloris Rosales CM

-------------------------------------------------------------------------------

RECEIVED A CALL BACK FROM BETH ORTIZ OF [facility].  UPDATED HER OF THE PATIENT'S CONDITION.  I 
ALSO ASKED HER IF THEY WANT TO TRANSFER THE PATIENT TO A CONTRACTED FACILITY.  SHE STATED IF 
WHERE THE PATIENT IS LOCATED AT THIS TIME, INFORMED HER THE THE PATIENT IS IN ICU.  SHE 
STATED WE DO NOT TRANSFER PATIENT IN ICU.  SHE ALSO STATED WILL FOLLOW UP WITH ME TOMORROW 
IF PATIENT IS STABLE.  [facility] AUTH [id number] WAS PROVIDED BY HER.

-------------------------------------------------------------------------------

Addendum: 06/22/20 at 1134 by Deloris Rosales CM

-------------------------------------------------------------------------------

JORGE YE MADE AWARE.



RECEIVED A CALL FROM BRIJESH DONATO [facility], STATING THAT THE PATIENT IS ON A BED HOLD.

-------------------------------------------------------------------------------

Addendum: 06/22/20 at 1229 by Deloris Rosales CM

-------------------------------------------------------------------------------

THIS IS A 43 Y/O MALE PATIENT FROM [facility], WHO WAS BIBA DUE TO UTI AND HEMATURIA.  
PAST MEDICAL HISTORY INCLUDE CHRONIC RESPIRATORY FAILURE, CVA, DYSPHAGIA WITH G TUBE.  
INITIAL DIAGNOSIS OF UTI, PNA, HEMATURIA AND COVID RULE OUT.  TRACH TO VENT, FIO2 30%, O2 
SAT 99%.  CURRENT LABS INCLUDE WBC 5.5, H/H 10.1/30.6, NA/K 141/3.6, BUN/CREA 17/0.7, ALB 
2.7.  COVID NEGATIVE X2.  ON ROCEPHIN.  PULMO AND URO CONSULTS IN PLACE.  DC PLAN PENDING ON 
PATIENT'S RESPONSE TO TREATMENT.

-------------------------------------------------------------------------------

Addendum: 06/23/20 at 0825 by Deloris Rosales CM

-------------------------------------------------------------------------------

RECEIVED A CALL FROM ALEX ORTIZ OF [employer], STATING THAT THE APPROVED DAYS IS UNTIL THE 
21ST AND WILL REVIEW YESTERDAY AND TODAY'S STAY.  SHE ALSO REQUESTED UPDATED CLINICALS.  
CLINICALS SENT.

-------------------------------------------------------------------------------

Addendum: 06/23/20 at 1023 by Deloris Rosales 

-------------------------------------------------------------------------------

CONTACTED ALEX HA [facility], REGARDING DC PLAN BACK TO [facility] TODAY.  SHE STATED FOR 
[facility], THEY HAVE TO CALL [facility] FOR AUTH AND FOR TRANSPORT AUTH WE CAN USE [facility] SINCE 
THEY ARE CONTRACTED WITH THEM.  MAY USE THE SAME AUTH [id number] FOR THE STAY.  

-------------------------------------------------------------------------------

Addendum: 06/23/20 at 1033 by Zenia Serna 

-------------------------------------------------------------------------------

FAXED CLINICALS TO [facility] FOLLOWED UP WITH BRIJESH IN ADMISSIONS SHE PROVIDED ROOM NUMBER 
[id number] FOR PATIENT. ACCEPTING DOCTOR DR. CEBALLOS. WILL SET UP TRANSPORTATION WITH [facility]. 

-------------------------------------------------------------------------------

Addendum: 06/23/20 at 1042 by Zenia Serna CM

-------------------------------------------------------------------------------

SPOKE WITH CHEYENNE WITH [facility] SET UP WILL CALL TRANSPORTATION FOR 2:00 PM

-------------------------------------------------------------------------------

Addendum: 06/23/20 at 1044 by Zenia Serna CM

-------------------------------------------------------------------------------

NOTIFIED NEMO JACKSON OF  TIME WITH [facility]. PROVIDED [facility] WITH AUTH # [id number]

-------------------------------------------------------------------------------

Addendum: 06/24/20 at 1620 by Deloris Rosales CM

-------------------------------------------------------------------------------

RECEIVED A CALL FROM [facility], INQUIRING IF THE PATIENT DISCHARGE TO [facility] 
YESTERDAY BECAUSE THEY DID NOT GET ANY CALLS FROM [facility] REGARDING AUTH.  INFORMED HER 
THAT THE PATIENT GOT DC'D BACK YESTERDAY.  PROVIDED HER WITH [employer] NUMBER.

## 2020-06-22 NOTE — NUR
PT. ADMITTED WITH LOW LV SCALE AT RISK,CONTINUE TO FOLLOW PRESSURE ULCER PREVENTION 
INTERVENTIONS.

-TURN AND REPOSITION PATIENT Q 2H 

-ASSESS AND MONITOR SKIN CONDITION DURING POSITION CHANGE

-OFFLOAD BILATERAL HEELS BY PLACING PILLOWS UNDER CALVES AT ALL TIMES, UNLESS OTHERWISE 
CONTRAINDICATED

-PRESSURE REDISTRIBUTION BY PLACING PILLOWS AND OFFLOADING SACRALCOCCYX

-KEEP SKIN CLEAN AND DRY AT ALL TIMES.

## 2020-06-22 NOTE — NUR
Received report from PM RN. Pt laying in bed semi-gonzalez, sleeping but arousable, awake and 
able to open eyes, Spo2 98% on vent (fio2 30%, tv 500, peep 5), rr 12 even and unlabored. 
Lung sounds with mild rhonchi BL. BS active x4, abd soft round large nontender. Gtube noted, 
jevity feeding at 50ml/hr with h2o flush 150ml/6hrs. 3-way fernandez with NS irrigating, mildly 
cloudy mild red-tinged urine noted. BL ankle edema pitting +1 noted.

## 2020-06-22 NOTE — NUR
STABLE RESTING WELL NO DISTRESS NOTED GOOD CHEST RISE DEEP TRACHEAL SUCTION FOR MODERATE 
THIN YELLOW SECRETIONS AIRWAY PATENT

## 2020-06-22 NOTE — NUR
Pt turned and pressure sites offloaded, awake and aphasic, Spo2 98% on vent (fio2 30%, tv 
500, peep 5), rr 12 even and unlabored. 3-way fernandez with NS irrigating, urine shows 
improvement in color of blood-tinge.

## 2020-06-22 NOTE — NUR
RECEIVED PATIENT IN STABLE CONDITION FROM AM SHIFT NURSE FOR CONTINUITY OF CARE. TELE 
PATIENT. TRACH TO VENT. VENT SETTINGS: AC/VC  PEEP 5 RR 12 FIO2 30%. RESPIRATIONS 
EVEN, UNLABORED. SUCTIONED WHITE THIN SECRETIONS. NO S/S RESPIRATORY DISTRESS. SKIN WARM, 
DRY. PICC TO UPPER RIGHT ARM PATENT/INTACT, INFUSING FLUIDS WELL. FLACC 0. NO S/S ACUTE 
DISTRESS. ABDOMEN SOFT, NONTENDER. BOWEL SOUNDS ACTIVE X4 QUADRANTS. CONTINUES ON ENTERAL 
FEEDING, TOLERATING WELL. HOB 30 DEGREES. SON CATHETER PATENT WITH DARK YELLOW URINE 
DRAINING TO GRAVITY. IRRIGATION RUNNING WELL. SAFETY PRECAUTIONS IN PLACE. SEIZURE 
PRECAUTIONS IN PLACE. CALL LIGHT WITHIN REACH.

## 2020-06-22 NOTE — NUR
PT WAS RECEIVED ON ACVC Vt 500  PEEP 5  f 12  FIO2 30% BMV AT BEDSIDE VENT PLUGGED INTO RED 
OUTLET - 2057 FIO2 WAS TITRATED DOWN TO 25% AND TOLERATING WELL WILL CONTINUE TO MONITOR

## 2020-06-22 NOTE — NUR
SPONGE BATH PROVIDED WITH GOOD AMANDA-CARE. FLACC 0. VAP ORAL CARE PROVIDED. REPOSITIONED WITH 
PRESSURE AREAS OFFLOADED. WILL CONT TO MONITOR FOR CHANGES.

## 2020-06-22 NOTE — NUR
REMOVED 16 F SON, REINSERTED 20 F THREE WAY SON CATHETER. STERILE PROCEDURE FOLLOWED. 
FLASHBACK OF URINE VISUALIZED, COLOR RED. PATIENT TOLERATED WELL. NO VISUAL SYMPTOMS OF 
PAIN. LEFT PATIENT CLEANED AND IN SEMI-ALCOCER'S POSITION. ALL NEEDS MET, CALL LIGHT WITHIN 
REACH. WILL CONTINUE TO MONITOR.

## 2020-06-22 NOTE — NUR
SUCTIONED TRACH WITH SMALL AMOUNT OF THIN, CLEAR, SECRETIONS NOTED. FLACC 0. REPOSITIONED. 
CATH CARE PROVIDED. SEIZURE PRECAUTIONS IN PLACE. WILL CONT TO MONITOR.

## 2020-06-22 NOTE — NUR
NOTE:



Patient's Orientation 

Person

Information Provided By 

CHASITY - NURSE

, Realtionship and Phone Number 

BRONSON MON

WIFE

[phone]

Identifying Problems 

No Social Work Triggers

Is A Social Work Consult Needed 

No

Mandate Report Filed 

No

Explanation Of Identifying Problems 

PATIENT IS A 44-YEAR-OLD MALE ADMITTED FOR UTI, PNEUMONIA, AND HEMATURIA. 

PATIENT HAS PMHX OF CHRONIC RESPIRATORY FAILURE, CVA, DYSPHAGIA W/ GTUBE 

PLACEMENT.

Admitted From 

Skilled Nursing Facility

Skilled Nursing Facility 

[facility] [phone]

Pre-Admission Level Of Functioning Status 

Total Care

Level Of Functioning Comment 

PATIENT IS NON-VERBAL.

Prior DME 

Hospital Bed

Factors/Needs 

SNF/NH Placement

Explanation And Or Other Factors Affecting/Possible DC Needs 

PATIENT IS [facility] AND ON A BED HOLD.

Discharge Plan Comments 

TENTATIVE DISCHARGE PLAN IS FOR PATIENT TO RETURN TO [facility].

DC Plan Status 

Initiated

## 2020-06-22 NOTE — NUR
ENDORSED AT BEDSIDE TO NIGHTSHIFT NURSE FOR CONTINUITY OF CARE. PT IS STABLE. GTUBE RUNNING 
JEVITY 1.2 AT 50 ML/HR, IVF  AT 20 ML/ HR. NO SIGNS OF RESPIRATORY DISTRESS. HOB RAISED. NO 
VISUAL SIGNS OF PAIN AT THIS TIME.

## 2020-06-22 NOTE — NUR
SPOKE TO ESTEE IN CT R/T CT WITH CONTRAST ORDER. INFORMED HIM THAT PT IS READY TO BE 
TRANSPORTED WITH ICU NURSE TO CT FOR PROCEDURE AT THIS TIME AND THAT PT IS NEGATIVE COVID 19 
X2. ESTEE STATED THAT HE WILL CALL BACK TO FOLLOW UP WITH PROCEDURE WHEN AVAILABLE. WILL 
FOLLOW-UP

## 2020-06-22 NOTE — NUR
PATIENT CONTINUES IN STABLE CONDITION. DUE MEDS GIVEN, TOLERATED WELL. FLACC 0. NO S/S ACUTE 
DISTRESS. CALL LIGHT WITHIN REACH.

## 2020-06-23 VITALS — DIASTOLIC BLOOD PRESSURE: 94 MMHG | SYSTOLIC BLOOD PRESSURE: 143 MMHG

## 2020-06-23 VITALS — SYSTOLIC BLOOD PRESSURE: 141 MMHG | DIASTOLIC BLOOD PRESSURE: 91 MMHG

## 2020-06-23 VITALS — DIASTOLIC BLOOD PRESSURE: 86 MMHG | SYSTOLIC BLOOD PRESSURE: 131 MMHG

## 2020-06-23 VITALS — SYSTOLIC BLOOD PRESSURE: 125 MMHG | DIASTOLIC BLOOD PRESSURE: 82 MMHG

## 2020-06-23 LAB
ANION GAP SERPL CALCULATED.3IONS-SCNC: 12.4 MMOL/L (ref 8–16)
BASOPHILS # BLD AUTO: 0.1 K/UL (ref 0–0.22)
BASOPHILS NFR BLD AUTO: 0.8 % (ref 0–2)
BUN SERPL-MCNC: 17 MG/DL (ref 7–18)
CHLORIDE SERPL-SCNC: 105 MMOL/L (ref 98–107)
CO2 SERPL-SCNC: 28.3 MMOL/L (ref 21–32)
CREAT SERPL-MCNC: 1 MG/DL (ref 0.6–1.3)
EOSINOPHIL # BLD AUTO: 0.4 K/UL (ref 0–0.4)
EOSINOPHIL NFR BLD AUTO: 4.6 % (ref 0–4)
ERYTHROCYTE [DISTWIDTH] IN BLOOD BY AUTOMATED COUNT: 16.9 % (ref 11.6–13.7)
GFR SERPL CREATININE-BSD FRML MDRD: 104 ML/MIN (ref 90–?)
GLUCOSE SERPL-MCNC: 80 MG/DL (ref 74–106)
HCT VFR BLD AUTO: 36.2 % (ref 36–52)
HGB BLD-MCNC: 11.8 G/DL (ref 12–18)
LYMPHOCYTES # BLD AUTO: 1.3 K/UL (ref 2–11.5)
LYMPHOCYTES NFR BLD AUTO: 14.4 % (ref 20.5–51.1)
MAGNESIUM SERPL-MCNC: 1.7 MG/DL (ref 1.8–2.4)
MCH RBC QN AUTO: 28 PG (ref 27–31)
MCHC RBC AUTO-ENTMCNC: 33 G/DL (ref 33–37)
MCV RBC AUTO: 85.6 FL (ref 80–94)
MONOCYTES # BLD AUTO: 0.8 K/UL (ref 0.8–1)
MONOCYTES NFR BLD AUTO: 8.5 % (ref 1.7–9.3)
NEUTROPHILS # BLD AUTO: 6.6 K/UL (ref 1.8–7.7)
NEUTROPHILS NFR BLD AUTO: 71.7 % (ref 42.2–75.2)
PHOSPHATE SERPL-MCNC: 3.9 MG/DL (ref 2.5–4.9)
PLATELET # BLD AUTO: 136 K/UL (ref 140–450)
POTASSIUM SERPL-SCNC: 3.7 MMOL/L (ref 3.5–5.1)
RBC # BLD AUTO: 4.23 MIL/UL (ref 4.2–6.1)
SODIUM SERPL-SCNC: 142 MMOL/L (ref 136–145)
WBC # BLD AUTO: 9.2 K/UL (ref 4.8–10.8)

## 2020-06-23 RX ADMIN — SODIUM CHLORIDE SCH MLS/HR: 9 INJECTION, SOLUTION INTRAVENOUS at 04:28

## 2020-06-23 RX ADMIN — DOCUSATE SODIUM SCH MG: 50 LIQUID ORAL at 08:56

## 2020-06-23 RX ADMIN — SENNOSIDES A AND B SCH MG: 8.6 TABLET, FILM COATED ORAL at 08:57

## 2020-06-23 RX ADMIN — Medication SCH EACH: at 08:56

## 2020-06-23 RX ADMIN — Medication SCH DRP: at 08:56

## 2020-06-23 NOTE — NUR
CHECKED ON PATIENT AT 0520, PATIENT EXPERIENCING SEIZURE. SEIZURE PRECAUTIONS IN PLACE. RT 
AT BEDSIDE. BHAKTI PALOMINO DR NOTIFIED WITH STAT ORDER FOR ATIVAN 1MG IV PUSH. ADMINISTERED TO 
PATIENT. AT 0525 SEIZURE ENDED. ELEVATED BLOOD PRESSURE 152/93 , MD AWARE. MD TO 
ADJUST POC. PATIENT CURRENTLY IN BED RESTING COMFORTABLY, TRACH TO VENT INTACT, AIRWAY 
PATENT, NO S/S OF RESPIRATORY DISTRESS, 02 STAT %. PATIENT ABLE TO BLINK EYES FOR YES 
OR NO, ABLE TO COMMUNICATE. HE IS IN STABLE CONDITION. FREQUENT VISUAL CHECKS BY STAFF.

## 2020-06-23 NOTE — NUR
PT RESTING IN STABLE CONDITION. NO SIGNS OF DISTRESS. FLACC-0. WILL CONTINUE TO ROUND 
FREQUENTLY ON PT.

## 2020-06-23 NOTE — NUR
RECEIVED REPORT FROM NIGHT SHIFT RN FOR CONTINUITY OF CARE. PT IS AAOX1, APHASIC BUT ABLE TO 
SIGNAL "YES" AND "NO" BY BLINKING HIS EYES. PT ON TRACH TO VENT. WITH RHONCHI NOTED. SKIN IS 
INTACT. PT ON TUBE FEEDING WITH JEVITY @ 50ML/HR AND H2O FLUSH OF 150ML/HR H6Q. PT HAS R 
UPPER ARM PICC LINE INFUSING NS@ 20ML/HR. ALL SAFETY MEASURES IN PLACE. BOARD UPDATED. BED 
IN LOW POSITION. WILL ROUND FREQUENTLY THROUGHOUT THE SHIFT.

## 2020-06-23 NOTE — NUR
ADMINISTERED MORNING MEDS. PT TOLERATED WELL. ALL MEDS GIVEN THROUGH G-TUBE. RESIDUALS 
CHECKED PRIOR TO GIVING MEDS. RESIDUAL AMOUNT NOTED TO BE 115ML. TUBE FEEDING CONTINUED. 
MAGNESIUM WAS 1.7 THIS MORNING. ORDER FOR MAGNESIUM JW RECEIVED AND INFUSING NOW @ 
25ML/HR. ALL NEEDS CURRENTLY MET. WILL CONTINUE TO ROUND FREQUENTLY ON PT.

## 2020-06-23 NOTE — NUR
REC'D PT ON CARESCAPE VENT SETTINGS AC12  PEEP 5 FIO2 24% ALARMS ON AND AUDIBLE AND 
BVM AT HOB VENT IS PLUGGED INTO RED OUTLET, SXN PT MODERATE AMT OF THICK WHITE SECRETIONS, 
B\S ARE CLEAR AND PT IS TRACH WITH PORTEX 7 PT IS RESTING

## 2020-06-23 NOTE — NUR
MADE ROUNDS. PATIENT CONTINUES IN STABLE CONDITION. NO S/S ACUTE DISTRESS. FLACC 0. CALL 
LIGHT WITHIN REACH.

## 2020-06-23 NOTE — NUR
RN AT BEDSIDE PT HAD SEIZURE VENT CHECK WAS PERFORMED AFTER SEIZURE VENT PLUGGED INTO RED 
OUTLET BMV AT BEDSIDE

## 2020-06-23 NOTE — NUR
PT DC TO [facility] FOR CONTINUITY OF CARE. PT PICC LINE AND SON CATHETER LEFT IN PLACE 
DUE TO CONTINUATION OF ABX THERAPY AND SON IRRIGATION AT FACILITY. ALL DC PAPERWORK GIVEN 
TO [facility] TEAM. ALL PERSONAL BELONGINGS LEFT WITH PT. PT LEFT IN STABLE CONDITION.